# Patient Record
Sex: MALE | Race: BLACK OR AFRICAN AMERICAN
[De-identification: names, ages, dates, MRNs, and addresses within clinical notes are randomized per-mention and may not be internally consistent; named-entity substitution may affect disease eponyms.]

---

## 2021-02-24 ENCOUNTER — HOSPITAL ENCOUNTER (EMERGENCY)
Dept: HOSPITAL 75 - ER | Age: 26
Discharge: HOME | End: 2021-02-24
Payer: SELF-PAY

## 2021-02-24 VITALS — HEIGHT: 70 IN | WEIGHT: 157.19 LBS | BODY MASS INDEX: 22.5 KG/M2

## 2021-02-24 VITALS — DIASTOLIC BLOOD PRESSURE: 74 MMHG | SYSTOLIC BLOOD PRESSURE: 116 MMHG

## 2021-02-24 DIAGNOSIS — G43.909: Primary | ICD-10-CM

## 2021-02-24 DIAGNOSIS — F17.200: ICD-10-CM

## 2021-02-24 NOTE — ED HEADACHE
General


Chief Complaint:  Head/Cervical Problems


Stated Complaint:  HA


Nursing Triage Note:  


AMB TO ROOM C/O HEADACHE SINCE THIS AM HAS HX OF HEADCHES HAS NOT TAKEN ANYTHING




FOR HIS HEADACHE TODAY. DENIES ALL COVID SYMPTOMS. OR EXPOSURE


Nursing Sepsis Screen:  No Definite Risk


Source:  patient


Exam Limitations:  no limitations


 (ABDON LENNON MD)





History of Present Illness


Date Seen by Provider:  Feb 24, 2021


Time Seen by Provider:  11:20


Timing/Duration:  1-3 hours


Severity/Quality:  moderate


Location:  temporal (right)


Prior Headaches/Recent Trauma:  occasional headaches


Modifying Factors:  worse with exposure to light


Associated Symptoms:  denies symptoms (ABDON LENNON MD)


Initial Comments


25-year-old -American male presents with a headache that is been present 

since last evening.  He awoke with it this morning.  He has a history of 

migraines with his last one being approximately 3 days ago.  He is not on any 

medications routinely for this he is complaining of photophobia and nausea with 

vomiting x1 since admission.  He has not taken any medication for the pain or 

nausea.  He reports moving here approximately 4 months ago from [facility].  He does not have a primary care provider.  He denies any visual 

changes at this time but at the onset of the headache he did see some bright 

lines.  He has never been on routine medications for migraines.


 (JEOVANY GARY)





Allergies and Home Medications


Allergies


Coded Allergies:  


     No Known Drug Allergies (Unverified , 2/24/21)





Patient Home Medication List


Home Medication List Reviewed:  Yes


 (JEOVANY GARY)





Review of Systems


Review of Systems


Constitutional:  no symptoms reported, see HPI


Gastrointestinal:  see HPI, nausea, vomiting


Psychiatric/Neurological:  See HPI, Headache (JEOVANY GARY)





All Other Systems Reviewed


Negative Unless Noted:  Yes


 (JEOVANY GARY)





Past Medical-Social-Family Hx


Past Med/Social Hx:  Reviewed Nursing Past Med/Soc Hx


 (JEOVANY GARY)


Patient Social History


Alcohol Use:  Denies Use


Smoking Status:  Current Everyday Smoker


Recent Infectious Disease Expo:  No


 (ABDON LENNON MD)


Drug of Choice:  Marijuana


 (JEOVANY GARY)





Physical Exam


Vital Signs





Vital Signs - First Documented








 2/24/21





 10:52


 


Temp 35.6


 


Pulse 74


 


Resp 18


 


B/P (MAP) 117/81 (93)


 


Pulse Ox 100


 


O2 Delivery Room Air





 (JEOVANY GARY)


Vital Signs


Capillary Refill : Less Than 3 Seconds 


 (ABDON LENNON MD)


Height, Weight, BMI


Height: '"


Weight: lbs. oz. kg; 22.00 BMI


Method:


 


 (ABDON LENNON MD)


General Appearance:  WD/WN, mild distress (Secondary to pain and photophobia)


HEENT:  PERRL/EOMI, normal ENT inspection, TMs normal, pharynx normal


Neck:  non-tender, full range of motion, supple, normal inspection


Cardiovascular:  normal peripheral pulses, regular rate, rhythm


Respiratory:  chest non-tender, lungs clear, normal breath sounds


Gastrointestinal:  normal bowel sounds, non tender, soft


Extremities:  normal range of motion, non-tender, normal inspection


Psychiatric:  alert, oriented x 3, depressed affect


Crainal Nerves:  normal hearing, normal speech, PERRL


Coordination/Gait:  normal finger to nose, normal gait


Motor/Sensory:  no motor deficit, no sensory deficit


Skin:  normal color, warm/dry (JEOVANY GARY)





Progress/Results/Core Measures


Results/Orders


My Orders





Orders - JEOVANY GARY


Ed Iv/Invasive Line Start (2/24/21 12:14)


Ns Iv 1000 Ml (Sodium Chloride 0.9%) (2/24/21 12:15)


Promethazine Injection (Phenergan Injec (2/24/21 12:15)


Ketorolac Injection (Toradol Injection) (2/24/21 12:14)


 (JEOVANY GARY)


Medications Given in ED





Current Medications








 Medications  Dose


 Ordered  Sig/Jose


 Route  Start Time


 Stop Time Status Last Admin


Dose Admin


 


 Promethazine HCl  25 mg  ONCE  ONCE


 IVP  2/24/21 12:15


 2/24/21 12:16 DC 2/24/21 12:35


25 MG





 (JEOVANY GARY)


Vital Signs/I&O











 2/24/21





 10:52


 


Temp 35.6


 


Pulse 74


 


Resp 18


 


B/P (MAP) 117/81 (93)


 


Pulse Ox 100


 


O2 Delivery Room Air





 (JEOVANY GARY)








Blood Pressure Mean:                    93











Progress


Progress Note :  


   Time:  11:20


Progress Note


Patient seen and evaluated initially by the medical student and assumed care by 

this provider@ 1200.  Reviewed assessment and documentation.


1210 normal saline 1 L per IV, Toradol 30 mg IV and Phenergan 25 mg IV.


1315 Patient sleeping, snoring.  Easily arousable.  Reports that his headache is

resolved.  Offered to start on medication for migraines, he prefers to be seen 

at the clinic.  Discharge instructions and return precautions reviewed.


 (JEOVANY GARY)





Departure


Impression





   Primary Impression:  


   Migraine


   Qualified Codes:  G43.109 - Migraine with aura, not intractable, without 

   status migrainosus


Disposition:  01 HOME, SELF-CARE


Condition:  Improved





Departure-Patient Inst.


Decision time for Depature:  13:15


 (JEOVANY GARY)


Ref[place]:  


[facility] [place]


Patient Instructions:  Migraines (DC)





Add. Discharge Instructions:  


Take Excedrin Migraine at the onset of headache.


Schedule appointment with [facility] for follow-up and to obtain 

medication for migraines.


Increase water intake, 16 ounces every 2 hours while awake.


Return to the emergency department for new, urgent healthcare needs.





All discharge instructions reviewed with patient and/or family. Voiced 

understanding.











ABDON LENNON MD         Feb 24, 2021 11:31


JEOVANY GARY                  Feb 24, 2021 12:39